# Patient Record
Sex: MALE | Race: WHITE | NOT HISPANIC OR LATINO | Employment: UNEMPLOYED | URBAN - METROPOLITAN AREA
[De-identification: names, ages, dates, MRNs, and addresses within clinical notes are randomized per-mention and may not be internally consistent; named-entity substitution may affect disease eponyms.]

---

## 2021-10-05 ENCOUNTER — OFFICE VISIT (OUTPATIENT)
Dept: FAMILY MEDICINE | Facility: CLINIC | Age: 46
End: 2021-10-05

## 2021-10-05 VITALS
HEIGHT: 73 IN | HEART RATE: 83 BPM | RESPIRATION RATE: 18 BRPM | BODY MASS INDEX: 31.81 KG/M2 | WEIGHT: 240 LBS | DIASTOLIC BLOOD PRESSURE: 78 MMHG | TEMPERATURE: 98 F | SYSTOLIC BLOOD PRESSURE: 124 MMHG | OXYGEN SATURATION: 97 %

## 2021-10-05 DIAGNOSIS — E78.5 DYSLIPIDEMIA: ICD-10-CM

## 2021-10-05 DIAGNOSIS — M54.50 CHRONIC BILATERAL LOW BACK PAIN, UNSPECIFIED WHETHER SCIATICA PRESENT: ICD-10-CM

## 2021-10-05 DIAGNOSIS — E11.40 DIABETIC NEUROPATHY, PAINFUL: ICD-10-CM

## 2021-10-05 DIAGNOSIS — I25.10 CORONARY ARTERY DISEASE INVOLVING NATIVE CORONARY ARTERY OF NATIVE HEART WITHOUT ANGINA PECTORIS: ICD-10-CM

## 2021-10-05 DIAGNOSIS — E11.621 DIABETIC ULCER OF TOE OF RIGHT FOOT ASSOCIATED WITH TYPE 2 DIABETES MELLITUS, UNSPECIFIED ULCER STAGE: ICD-10-CM

## 2021-10-05 DIAGNOSIS — L97.529 DIABETIC ULCER OF TOE OF LEFT FOOT ASSOCIATED WITH TYPE 2 DIABETES MELLITUS, UNSPECIFIED ULCER STAGE: ICD-10-CM

## 2021-10-05 DIAGNOSIS — I10 ESSENTIAL HYPERTENSION: ICD-10-CM

## 2021-10-05 DIAGNOSIS — Z76.89 ENCOUNTER TO ESTABLISH CARE: Primary | ICD-10-CM

## 2021-10-05 DIAGNOSIS — L97.509 TYPE 2 DIABETES MELLITUS WITH FOOT ULCER, WITH LONG-TERM CURRENT USE OF INSULIN: ICD-10-CM

## 2021-10-05 DIAGNOSIS — Z79.891 LONG TERM CURRENT USE OF OPIATE ANALGESIC: ICD-10-CM

## 2021-10-05 DIAGNOSIS — E11.621 DIABETIC ULCER OF TOE OF LEFT FOOT ASSOCIATED WITH TYPE 2 DIABETES MELLITUS, UNSPECIFIED ULCER STAGE: ICD-10-CM

## 2021-10-05 DIAGNOSIS — Z79.899 ENCOUNTER FOR LONG-TERM (CURRENT) USE OF OTHER MEDICATIONS: ICD-10-CM

## 2021-10-05 DIAGNOSIS — Z79.4 TYPE 2 DIABETES MELLITUS WITH FOOT ULCER, WITH LONG-TERM CURRENT USE OF INSULIN: ICD-10-CM

## 2021-10-05 DIAGNOSIS — L97.519 DIABETIC ULCER OF TOE OF RIGHT FOOT ASSOCIATED WITH TYPE 2 DIABETES MELLITUS, UNSPECIFIED ULCER STAGE: ICD-10-CM

## 2021-10-05 DIAGNOSIS — G89.29 CHRONIC BILATERAL LOW BACK PAIN, UNSPECIFIED WHETHER SCIATICA PRESENT: ICD-10-CM

## 2021-10-05 DIAGNOSIS — E11.621 TYPE 2 DIABETES MELLITUS WITH FOOT ULCER, WITH LONG-TERM CURRENT USE OF INSULIN: ICD-10-CM

## 2021-10-05 PROCEDURE — 99214 PR OFFICE/OUTPT VISIT, EST, LEVL IV, 30-39 MIN: ICD-10-PCS | Mod: S$PBB,,, | Performed by: NURSE PRACTITIONER

## 2021-10-05 PROCEDURE — 99205 OFFICE O/P NEW HI 60 MIN: CPT | Performed by: NURSE PRACTITIONER

## 2021-10-05 PROCEDURE — 99214 OFFICE O/P EST MOD 30 MIN: CPT | Mod: S$PBB,,, | Performed by: NURSE PRACTITIONER

## 2021-10-05 RX ORDER — LISINOPRIL 40 MG/1
40 TABLET ORAL DAILY
COMMUNITY

## 2021-10-05 RX ORDER — OXYCODONE AND ACETAMINOPHEN 10; 325 MG/1; MG/1
1 TABLET ORAL EVERY 6 HOURS PRN
Qty: 28 TABLET | Refills: 0 | Status: SHIPPED | OUTPATIENT
Start: 2021-10-05 | End: 2021-10-12

## 2021-10-05 RX ORDER — INSULIN GLARGINE 100 [IU]/ML
50 INJECTION, SOLUTION SUBCUTANEOUS 2 TIMES DAILY
COMMUNITY
End: 2021-10-05 | Stop reason: SDUPTHER

## 2021-10-05 RX ORDER — INSULIN LISPRO 100 [IU]/ML
INJECTION, SOLUTION INTRAVENOUS; SUBCUTANEOUS
COMMUNITY

## 2021-10-05 RX ORDER — INSULIN GLARGINE 100 [IU]/ML
50 INJECTION, SOLUTION SUBCUTANEOUS 2 TIMES DAILY
Qty: 30 ML | Refills: 2 | Status: SHIPPED | OUTPATIENT
Start: 2021-10-05

## 2021-10-05 RX ORDER — DESONIDE 0.5 MG/G
CREAM TOPICAL 2 TIMES DAILY PRN
COMMUNITY

## 2021-10-05 RX ORDER — OXYCODONE HYDROCHLORIDE 15 MG/1
15 TABLET ORAL EVERY 4 HOURS PRN
COMMUNITY

## 2021-10-05 RX ORDER — OXYCODONE AND ACETAMINOPHEN 10; 325 MG/1; MG/1
1 TABLET ORAL EVERY 6 HOURS PRN
Qty: 28 TABLET | Refills: 0 | Status: SHIPPED | OUTPATIENT
Start: 2021-10-05 | End: 2021-10-05

## 2021-10-06 ENCOUNTER — TELEPHONE (OUTPATIENT)
Dept: FAMILY MEDICINE | Facility: CLINIC | Age: 46
End: 2021-10-06

## 2023-02-14 ENCOUNTER — TELEPHONE (OUTPATIENT)
Dept: FAMILY MEDICINE | Facility: CLINIC | Age: 48
End: 2023-02-14
Payer: COMMERCIAL

## 2023-02-14 NOTE — TELEPHONE ENCOUNTER
Wanted to know what time his appt was for tomorrow.    Did let wife know it was at 10:40 2/15/23    Keara Gaines RN  [facility] ~ Registered Nurse  Clinic Triage ~ [facility]  February 14, 2023

## 2023-02-15 ENCOUNTER — TELEPHONE (OUTPATIENT)
Dept: WOUND CARE | Facility: CLINIC | Age: 48
End: 2023-02-15

## 2023-02-15 ENCOUNTER — OFFICE VISIT (OUTPATIENT)
Dept: FAMILY MEDICINE | Facility: CLINIC | Age: 48
End: 2023-02-15
Payer: COMMERCIAL

## 2023-02-15 ENCOUNTER — TELEPHONE (OUTPATIENT)
Dept: OTHER | Facility: CLINIC | Age: 48
End: 2023-02-15

## 2023-02-15 VITALS
OXYGEN SATURATION: 97 % | SYSTOLIC BLOOD PRESSURE: 140 MMHG | TEMPERATURE: 97.8 F | WEIGHT: 233 LBS | HEIGHT: 71 IN | HEART RATE: 71 BPM | BODY MASS INDEX: 32.62 KG/M2 | RESPIRATION RATE: 14 BRPM | DIASTOLIC BLOOD PRESSURE: 77 MMHG

## 2023-02-15 DIAGNOSIS — L97.516 DIABETIC ULCER OF TOE OF RIGHT FOOT ASSOCIATED WITH TYPE 2 DIABETES MELLITUS, WITH BONE INVOLVEMENT WITHOUT EVIDENCE OF NECROSIS (H): ICD-10-CM

## 2023-02-15 DIAGNOSIS — E11.621 DIABETIC ULCER OF TOE OF RIGHT FOOT ASSOCIATED WITH TYPE 2 DIABETES MELLITUS, WITH BONE INVOLVEMENT WITHOUT EVIDENCE OF NECROSIS (H): ICD-10-CM

## 2023-02-15 DIAGNOSIS — M86.071 ACUTE HEMATOGENOUS OSTEOMYELITIS OF RIGHT FOOT (H): ICD-10-CM

## 2023-02-15 DIAGNOSIS — E11.628 DIABETIC FOOT INFECTION (H): ICD-10-CM

## 2023-02-15 DIAGNOSIS — M86.60 CHRONIC OSTEOMYELITIS (H): Primary | ICD-10-CM

## 2023-02-15 DIAGNOSIS — L97.516 DIABETIC ULCER OF TOE OF RIGHT FOOT ASSOCIATED WITH TYPE 2 DIABETES MELLITUS, WITH BONE INVOLVEMENT WITHOUT EVIDENCE OF NECROSIS (H): Primary | ICD-10-CM

## 2023-02-15 DIAGNOSIS — N18.9 CHRONIC KIDNEY DISEASE-MINERAL AND BONE DISORDER: ICD-10-CM

## 2023-02-15 DIAGNOSIS — G89.4 CHRONIC PAIN SYNDROME: ICD-10-CM

## 2023-02-15 DIAGNOSIS — M89.9 CHRONIC KIDNEY DISEASE-MINERAL AND BONE DISORDER: ICD-10-CM

## 2023-02-15 DIAGNOSIS — E83.9 CHRONIC KIDNEY DISEASE-MINERAL AND BONE DISORDER: ICD-10-CM

## 2023-02-15 DIAGNOSIS — L08.9 DIABETIC FOOT INFECTION (H): ICD-10-CM

## 2023-02-15 DIAGNOSIS — E11.621 DIABETIC ULCER OF TOE OF RIGHT FOOT ASSOCIATED WITH TYPE 2 DIABETES MELLITUS, WITH BONE INVOLVEMENT WITHOUT EVIDENCE OF NECROSIS (H): Primary | ICD-10-CM

## 2023-02-15 PROBLEM — E11.40 DIABETIC NEUROPATHY (H): Status: ACTIVE | Noted: 2019-08-15

## 2023-02-15 LAB
ALBUMIN SERPL BCG-MCNC: 3.9 G/DL (ref 3.5–5.2)
ALP SERPL-CCNC: 87 U/L (ref 40–129)
ALT SERPL W P-5'-P-CCNC: 14 U/L (ref 10–50)
ANION GAP SERPL CALCULATED.3IONS-SCNC: 13 MMOL/L (ref 7–15)
AST SERPL W P-5'-P-CCNC: 14 U/L (ref 10–50)
BASOPHILS # BLD AUTO: 0 10E3/UL (ref 0–0.2)
BASOPHILS NFR BLD AUTO: 0 %
BILIRUB SERPL-MCNC: 0.2 MG/DL
BUN SERPL-MCNC: 22.1 MG/DL (ref 6–20)
CALCIUM SERPL-MCNC: 9.7 MG/DL (ref 8.6–10)
CHLORIDE SERPL-SCNC: 103 MMOL/L (ref 98–107)
CHOLEST SERPL-MCNC: 194 MG/DL
CREAT SERPL-MCNC: 1.01 MG/DL (ref 0.67–1.17)
CREAT UR-MCNC: 77 MG/DL
CREAT UR-MCNC: 77.2 MG/DL
CRP SERPL-MCNC: 31 MG/L
DEPRECATED HCO3 PLAS-SCNC: 21 MMOL/L (ref 22–29)
EOSINOPHIL # BLD AUTO: 0.2 10E3/UL (ref 0–0.7)
EOSINOPHIL NFR BLD AUTO: 3 %
ERYTHROCYTE [DISTWIDTH] IN BLOOD BY AUTOMATED COUNT: 13.2 % (ref 10–15)
ERYTHROCYTE [SEDIMENTATION RATE] IN BLOOD BY WESTERGREN METHOD: 92 MM/HR (ref 0–15)
GFR SERPL CREATININE-BSD FRML MDRD: >90 ML/MIN/1.73M2
GLUCOSE SERPL-MCNC: 282 MG/DL (ref 70–99)
HBA1C MFR BLD: 9.8 % (ref 0–5.6)
HCT VFR BLD AUTO: 40.9 % (ref 40–53)
HDLC SERPL-MCNC: 26 MG/DL
HGB BLD-MCNC: 13.4 G/DL (ref 13.3–17.7)
LDLC SERPL CALC-MCNC: 96 MG/DL
LYMPHOCYTES # BLD AUTO: 1.7 10E3/UL (ref 0.8–5.3)
LYMPHOCYTES NFR BLD AUTO: 19 %
MCH RBC QN AUTO: 28.9 PG (ref 26.5–33)
MCHC RBC AUTO-ENTMCNC: 32.8 G/DL (ref 31.5–36.5)
MCV RBC AUTO: 88 FL (ref 78–100)
MICROALBUMIN UR-MCNC: 474 MG/L
MICROALBUMIN/CREAT UR: 613.99 MG/G CR (ref 0–17)
MONOCYTES # BLD AUTO: 0.4 10E3/UL (ref 0–1.3)
MONOCYTES NFR BLD AUTO: 5 %
NEUTROPHILS # BLD AUTO: 6.6 10E3/UL (ref 1.6–8.3)
NEUTROPHILS NFR BLD AUTO: 74 %
NONHDLC SERPL-MCNC: 168 MG/DL
PLATELET # BLD AUTO: 192 10E3/UL (ref 150–450)
POTASSIUM SERPL-SCNC: 5.1 MMOL/L (ref 3.4–5.3)
PROT SERPL-MCNC: 7.4 G/DL (ref 6.4–8.3)
RBC # BLD AUTO: 4.64 10E6/UL (ref 4.4–5.9)
SODIUM SERPL-SCNC: 137 MMOL/L (ref 136–145)
TRIGL SERPL-MCNC: 358 MG/DL
WBC # BLD AUTO: 8.9 10E3/UL (ref 4–11)

## 2023-02-15 PROCEDURE — 80307 DRUG TEST PRSMV CHEM ANLYZR: CPT | Performed by: FAMILY MEDICINE

## 2023-02-15 PROCEDURE — 99205 OFFICE O/P NEW HI 60 MIN: CPT | Performed by: FAMILY MEDICINE

## 2023-02-15 PROCEDURE — 80053 COMPREHEN METABOLIC PANEL: CPT | Performed by: FAMILY MEDICINE

## 2023-02-15 PROCEDURE — 82043 UR ALBUMIN QUANTITATIVE: CPT | Performed by: FAMILY MEDICINE

## 2023-02-15 PROCEDURE — 85652 RBC SED RATE AUTOMATED: CPT | Performed by: FAMILY MEDICINE

## 2023-02-15 PROCEDURE — 85025 COMPLETE CBC W/AUTO DIFF WBC: CPT | Performed by: FAMILY MEDICINE

## 2023-02-15 PROCEDURE — 83036 HEMOGLOBIN GLYCOSYLATED A1C: CPT | Performed by: FAMILY MEDICINE

## 2023-02-15 PROCEDURE — 82570 ASSAY OF URINE CREATININE: CPT | Performed by: FAMILY MEDICINE

## 2023-02-15 PROCEDURE — 99207 PR FOOT EXAM NO CHARGE: CPT | Performed by: FAMILY MEDICINE

## 2023-02-15 PROCEDURE — 80061 LIPID PANEL: CPT | Performed by: FAMILY MEDICINE

## 2023-02-15 PROCEDURE — 86140 C-REACTIVE PROTEIN: CPT | Performed by: FAMILY MEDICINE

## 2023-02-15 PROCEDURE — 36415 COLL VENOUS BLD VENIPUNCTURE: CPT | Performed by: FAMILY MEDICINE

## 2023-02-15 RX ORDER — INSULIN GLARGINE 100 [IU]/ML
50 INJECTION, SOLUTION SUBCUTANEOUS
COMMUNITY
Start: 2021-10-05

## 2023-02-15 RX ORDER — GABAPENTIN 800 MG/1
TABLET ORAL
COMMUNITY
Start: 2022-05-15

## 2023-02-15 RX ORDER — METOPROLOL TARTRATE 25 MG/1
TABLET, FILM COATED ORAL
COMMUNITY
Start: 2022-10-14

## 2023-02-15 RX ORDER — L. ACIDOPHILUS/BIFIDO. LONGUM 15 MG
CAPSULE,DELAYED RELEASE (ENTERIC COATED) ORAL
COMMUNITY
Start: 2022-02-08

## 2023-02-15 RX ORDER — LISINOPRIL 40 MG/1
TABLET ORAL
COMMUNITY
Start: 2022-09-01

## 2023-02-15 RX ORDER — ASPIRIN 81 MG/81MG
CAPSULE ORAL
COMMUNITY

## 2023-02-15 RX ORDER — DESONIDE 0.5 MG/G
CREAM TOPICAL
COMMUNITY
Start: 2022-04-01

## 2023-02-15 RX ORDER — INSULIN LISPRO 100 [IU]/ML
INJECTION, SOLUTION INTRAVENOUS; SUBCUTANEOUS
COMMUNITY

## 2023-02-15 RX ORDER — OXYCODONE HYDROCHLORIDE 20 MG/1
TABLET ORAL
COMMUNITY
Start: 2022-10-18 | End: 2023-02-15

## 2023-02-15 RX ORDER — OXYCODONE HYDROCHLORIDE 20 MG/1
20 TABLET ORAL EVERY 6 HOURS
Qty: 120 TABLET | Refills: 0 | Status: SHIPPED | OUTPATIENT
Start: 2023-02-15 | End: 2023-02-16

## 2023-02-15 RX ORDER — TRAZODONE HYDROCHLORIDE 100 MG/1
TABLET ORAL
COMMUNITY
Start: 2023-01-03

## 2023-02-15 RX ORDER — INSULIN LISPRO 100 [IU]/ML
15 INJECTION, SOLUTION INTRAVENOUS; SUBCUTANEOUS
COMMUNITY
Start: 2022-02-17

## 2023-02-15 RX ORDER — NITROGLYCERIN 0.4 MG/1
TABLET SUBLINGUAL
COMMUNITY
Start: 2022-06-06

## 2023-02-15 RX ORDER — BLOOD SUGAR DIAGNOSTIC
STRIP MISCELLANEOUS
COMMUNITY

## 2023-02-15 RX ORDER — ALBUTEROL SULFATE 90 UG/1
AEROSOL, METERED RESPIRATORY (INHALATION)
COMMUNITY
Start: 2022-05-02

## 2023-02-15 RX ORDER — CLOPIDOGREL BISULFATE 75 MG/1
TABLET ORAL
COMMUNITY
Start: 2022-03-14

## 2023-02-15 ASSESSMENT — ANXIETY QUESTIONNAIRES
4. TROUBLE RELAXING: NOT AT ALL
8. IF YOU CHECKED OFF ANY PROBLEMS, HOW DIFFICULT HAVE THESE MADE IT FOR YOU TO DO YOUR WORK, TAKE CARE OF THINGS AT HOME, OR GET ALONG WITH OTHER PEOPLE?: NOT DIFFICULT AT ALL
7. FEELING AFRAID AS IF SOMETHING AWFUL MIGHT HAPPEN: NOT AT ALL
6. BECOMING EASILY ANNOYED OR IRRITABLE: NOT AT ALL
GAD7 TOTAL SCORE: 0
GAD7 TOTAL SCORE: 0
1. FEELING NERVOUS, ANXIOUS, OR ON EDGE: NOT AT ALL
2. NOT BEING ABLE TO STOP OR CONTROL WORRYING: NOT AT ALL
5. BEING SO RESTLESS THAT IT IS HARD TO SIT STILL: NOT AT ALL
IF YOU CHECKED OFF ANY PROBLEMS ON THIS QUESTIONNAIRE, HOW DIFFICULT HAVE THESE PROBLEMS MADE IT FOR YOU TO DO YOUR WORK, TAKE CARE OF THINGS AT HOME, OR GET ALONG WITH OTHER PEOPLE: NOT DIFFICULT AT ALL
GAD7 TOTAL SCORE: 0
3. WORRYING TOO MUCH ABOUT DIFFERENT THINGS: NOT AT ALL
7. FEELING AFRAID AS IF SOMETHING AWFUL MIGHT HAPPEN: NOT AT ALL

## 2023-02-15 ASSESSMENT — PAIN SCALES - GENERAL: PAINLEVEL: WORST PAIN (10)

## 2023-02-15 NOTE — LETTER
Opioid / Opioid Plus Controlled Substance Agreement    This is an agreement between you and your provider about the safe and appropriate use of controlled substance/opioids prescribed by your care team. Controlled substances are medicines that can cause physical and mental dependence (abuse).    There are strict laws about having and using these medicines. We here at [facility] are committing to working with you in your efforts to get better. To support you in this work, we ll help you schedule regular office appointments for medicine refills. If we must cancel or change your appointment for any reason, we ll make sure you have enough medicine to last until your next appointment.     As a Provider, I will:    Listen carefully to your concerns and treat you with respect.     Recommend a treatment plan that I believe is in your best interest. This plan may involve therapies other than opioid pain medication.     Talk with you often about the possible benefits, and the risk of harm of any medicine that we prescribe for you.     Provide a plan on how to taper (discontinue or go off) using this medicine if the decision is made to stop its use.    As a Patient, I understand that opioid(s):     Are a controlled substance prescribed by my care team to help me function or work and manage my condition(s).     Are strong medicines and can cause serious side effects such as:    Drowsiness, which can seriously affect my driving ability    A lower breathing rate, enough to cause death    Harm to my thinking ability     Depression     Abuse of and addiction to this medicine    Need to be taken exactly as prescribed. Combining opioids with certain medicines or chemicals (such as illegal drugs, sedatives, sleeping pills, and benzodiazepines) can be dangerous or even fatal. If I stop opioids suddenly, I may have severe withdrawal symptoms.    Do not work for all types of pain nor for all patients. If they re not helpful, I may  be asked to stop them.        The risks, benefits and side effects of these medicine(s) were explained to me. I agree that:  1. I will take part in other treatments as advised by my care team. This may be psychiatry or counseling, physical therapy, behavioral therapy, group treatment or a referral to a specialist.     2. I will keep all my appointments. I understand that this is part of the monitoring of opioids. My care team may require an office visit for EVERY opioid/controlled substance refill. If I miss appointments or don t follow instructions, my care team may stop my medicine.    3. I will take my medicines as prescribed. I will not change the dose or schedule unless my care team tells me to. There will be no refills if I run out early.     4. I may be asked to come to the clinic and complete a urine drug test or complete a pill count at any time. If I don t give a urine sample or participate in a pill count, the care team may stop my medicine.    5. I will only receive prescriptions from this clinic for chronic pain. If I am treated by another provider for acute pain issues, I will tell them that I am taking opioid pain medication for chronic pain and that I have a treatment agreement with this provider. I will inform my [facility] care team within one business day if I am given a prescription for any pain medication by another healthcare provider. My [facility] care team can contact other providers and pharmacists about my use of any medicines.    6. It is up to me to make sure that I don t run out of my medicines on weekends or holidays. If my care team is willing to refill my opioid prescription without a visit, I must request refills only during office hours. Refills may take up to 3 business days to process. I will use one pharmacy to fill all my opioid and other controlled substance prescriptions. I will notify the clinic about any changes to my insurance or medication  availability.    7. I am responsible for my prescriptions. If the medicine/prescription is lost, stolen or destroyed, it will not be replaced. I also agree not to share controlled substance medicines with anyone.    8. I am aware I should not use any illegal or recreational drugs. I agree not to drink alcohol unless my care team says I can.       9. If I enroll in the [place] Medical Cannabis program, I will tell my care team prior to my next refill.     10. I will tell my care team right away if I become pregnant, have a new medical problem treated outside of my regular clinic, or have a change in my medications.    11. I understand that this medicine can affect my thinking, judgment and reaction time. Alcohol and drugs affect the brain and body, which can affect the safety of my driving. Being under the influence of alcohol or drugs can affect my decision-making, behaviors, personal safety, and the safety of others. Driving while impaired (DWI) can occur if a person is driving, operating, or in physical control of a car, motorcycle, boat, snowmobile, ATV, motorbike, off-road vehicle, or any other motor vehicle ([place] Statute 169A.20). I understand the risk if I choose to drive or operate any vehicle or machinery.    I understand that if I do not follow any of the conditions above, my prescriptions or treatment may be stopped or changed.          Opioids  What You Need to Know    What are opioids?   Opioids are pain medicines that must be prescribed by a doctor. They are also known as narcotics.     Examples are:   1. morphine (MS Contin, Thu)  2. oxycodone (Oxycontin)  3. oxycodone and acetaminophen (Percocet)  4. hydrocodone and acetaminophen (Vicodin, Norco)   5. fentanyl patch (Duragesic)   6. hydromorphone (Dilaudid)   7. methadone  8. codeine (Tylenol #3)     What do opioids do well?   Opioids are best for severe short-term pain such as after a surgery or injury. They may work well for cancer pain. They may  help some people with long-lasting (chronic) pain.     What do opioids NOT do well?   Opioids never get rid of pain entirely, and they don t work well for most patients with chronic pain. Opioids don t reduce swelling, one of the causes of pain.                                    Other ways to manage chronic pain and improve function include:       Treat the health problem that may be causing pain    Anti-inflammation medicines, which reduce swelling and tenderness, such as ibuprofen (Advil, Motrin) or naproxen (Aleve)    Acetaminophen (Tylenol)    Antidepressants and anti-seizure medicines, especially for nerve pain    Topical treatments such as patches or creams    Injections or nerve blocks    Chiropractic or osteopathic treatment    Acupuncture, massage, deep breathing, meditation, visual imagery, aromatherapy    Use heat or ice at the pain site    Physical therapy     Exercise    Stop smoking    Take part in therapy       Risks and side effects     Talk to your doctor before you start or decide to keep taking opioids. Possible side effects include:      Lowering your breathing rate enough to cause death    Overdose, including death, especially if taking higher than prescribed doses    Worse depression symptoms; less pleasure in things you usually enjoy    Feeling tired or sluggish    Slower thoughts or cloudy thinking    Being more sensitive to pain over time; pain is harder to control    Trouble sleeping or restless sleep    Changes in hormone levels (for example, less testosterone)    Changes in sex drive or ability to have sex    Constipation    Unsafe driving    Itching and sweating    Dizziness    Nausea, throwing up and dry mouth    What else should I know about opioids?    Opioids may lead to dependence, tolerance, or addiction.      Dependence means that if you stop or reduce the medicine too quickly, you will have withdrawal symptoms. These include loose poop (diarrhea), jitters, flu-like symptoms,  nervousness and tremors. Dependence is not the same as addiction.                       Tolerance means needing higher doses over time to get the same effect. This may increase the chance of serious side effects.      Addiction is when people improperly use a substance that harms their body, their mind or their relations with others. Use of opiates can cause a relapse of addiction if you have a history of drug or alcohol abuse.      People who have used opioids for a long time may have a lower quality of life, worse depression, higher levels of pain and more visits to doctors.    You can overdose on opioids. Take these steps to lower your risk of overdose:    1. Recognize the signs:  Signs of overdose include decrease or loss of consciousness (blackout), slowed breathing, trouble waking up and blue lips. If someone is worried about overdose, they should call 911.    2. Talk to your doctor about Narcan (naloxone).   If you are at risk for overdose, you may be given a prescription for Narcan. This medicine very quickly reverses the effects of opioids.   If you overdose, a friend or family member can give you Narcan while waiting for the ambulance. They need to know the signs of overdose and how to give Narcan.     3. Don't use alcohol or street drugs.   Taking them with opioids can cause death.    4. Do not take any of these medicines unless your doctor says it s OK. Taking these with opioids can cause death:    Benzodiazepines, such as lorazepam (Ativan), alprazolam (Xanax) or diazepam (Valium)    Muscle relaxers, such as cyclobenzaprine (Flexeril)    Sleeping pills like zolpidem (Ambien)     Other opioids      How to keep you and other people safe while taking opioids:    1. Never share your opioids with others.  Opioid medicines are regulated by the Drug Enforcement Agency ([employer]). Selling or sharing medications is a criminal act.    2. Be sure to store opioids in a secure place, locked up if possible. Young children  can easily swallow them and overdose.    3. When you are traveling with your medicines, keep them in the original bottles. If you use a pill box, be sure you also carry a copy of your medicine list from your clinic or pharmacy.    4. Safe disposal of opioids    Most pharmacies have places to get rid of medicine, called disposal kiosks. Medicine disposal options are also available in every [facility]. Search your county and  medication disposal  to find more options. You can find more details at:  [email or web address]     I agree that my provider, clinic care team, and pharmacy may work with any city, state or federal law enforcement agency that investigates the misuse, sale, or other diversion of my controlled medicine. I will allow my provider to discuss my care with, or share a copy of, this agreement with any other treating provider, pharmacy or emergency room where I receive care.    I have read this agreement and have asked questions about anything I did not understand.    _______________________________________________________  Patient Signature - Olivier Thacker _____________________                   Date     _______________________________________________________  Provider Signature - Rocael Servin MD   _____________________                   Date     _______________________________________________________  Witness Signature (required if provider not present while patient signing)   _____________________                   Date

## 2023-02-15 NOTE — TELEPHONE ENCOUNTER
Pt referred to [facility] by Rocael Servin MD for bilateral foot ulcer.    Per review of chart, appears patient has traveled from [place] to [place] for second opinion.    Care Everywhere:  10/2/22 - BLE arterial duplex (formerly [facility])  IMPRESSION:   Flow seen throughout both lower extremities with degradation of waveforms in the 3 runoff vessels and dorsalis pedis artery but color flow is seen throughout. There is stenoses mild at the tibial peroneal trunk on the right more significant at the popliteal artery on the left.          10/6/22 BLE angiogram: (formerly [facility])  Critical limb ischemia with left lower extremity nonhealing wound and   bilateral osteomyelitis.   Severe left anterior tibial disease   Severe left peroneal disease   Moderate nonobstructive right distal superficial femoral artery disease   Moderate nonobstructive right popliteal disease   Severe right anterior tibial disease   Successful atherectomy, angioplasty and drug-eluting balloon angioplasty   of the left anterior tibial artery   Successful atherectomy and angioplasty of the left peroneal artery   Successful atherectomy and angioplasty of the right anterior tibial artery       Pt needs to be scheduled for MARCI/TBI and consult with Vascular Surgery.  Will route to scheduling to coordinate an appointment within 1-2 weeks.    Please advise patient he needs to request to have imaging sent to [facility] on a disc of the above imaging from formerly [facility].      Appt note: Ref by Dr. Rocael Servin for bilateral foot ulcers; notes in Care Everywhere; seeking second opinion from [place]; angio report from 10/6/22 in Care Everywhere; MARCI/TBI to be scheduled.    JAKI HernándezN, RN-[facility] [place]

## 2023-02-15 NOTE — TELEPHONE ENCOUNTER
"Multiple family members called on behalf of the patient. Writer spoke with both the patient's daughter and wife. Patient has wounds on both feet. 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 Patient has struggled with healing since then. He also has a right foot ulcer on the bottom of the right foot and pinky toe of the right foot with an active infection as well as osteomyelitis. The patient's wife also noted \"lots\" of swelling and drainage from both feet. Patient is a diabetic.     He and his wife very recently moved up to [place] from [place] in hopes of finding care. They are still getting settled have yet to secure wound care, however the wife has been doing what she can to provide him wound care.       According to the wife, the patient has Blue [employer] ([place] [employer]) insurance. Member # is [id number]. It is the Silver Plan. Writer will work on adding coverage to his chart.    Please call Essie (patient's wife) at [phone]  "

## 2023-02-15 NOTE — PROGRESS NOTES
Assessment & Plan     Olivier was seen today for establish care and diabetes.    Diagnoses and all orders for this visit:    Chronic osteomyelitis (H)  Diabetic ulcer of toe of right foot associated with type 2 diabetes mellitus, with bone involvement without evidence of necrosis (H)  Diabetic foot infection (H)  Acute hematogenous osteomyelitis of right foot (H)  47-year-old who presents to the clinic to establish care.  Patient moved from [place] to [place] seeking a second opinion regarding chronic osteomyelitis.  He was seen and evaluated by infectious disease and vascular recently and it was recommended to proceed with an amputation of the left foot.  Patient does not desire to proceed with an amputation therefore he is here seeking a second opinion.  His medical history significant for poorly controlled type 2 diabetes, hypertension, coronary artery disease, hyperlipidemia, peripheral neuropathy, factor V Leiden carrier, mild intermittent asthma, chronic pain, continuous use of opioids.  Clinical examination today is consistent with osteomyelitis.  The patient is afebrile with vitals significant for mildly elevated blood pressure.  Initial lab results were negative for leukocytosis.  Mild to moderate elevation of his ESR and CRP which is consistent with chronic osteomyelitis.  Patient was advised to establish care with vascular, wound clinic, and pain clinic.  Return to clinic for management of other chronic diseases.  I extended his Ceftin prescription for the next 2 weeks.  Also referred him to infectious diseases.  -     MR Foot Left w/o Contrast; Future  -     MR Foot Right w/o Contrast; Future  -     Vascular Surgery Referral; Future  -     Wound Care Referral; Future  -     Discontinue: oxyCODONE HCl (ROXICODONE) 20 MG TABS immediate release tablet; Take 20 mg by mouth every 6 hours for 30 days  -     CBC with platelets and differential; Future  -     Comprehensive metabolic panel (BMP + Alb, Alk Phos,  ALT, AST, Total. Bili, TP); Future  -     ESR: Erythrocyte sedimentation rate; Future  -     CRP, inflammation; Future  -     Pain Management  Referral; Future  -     Drug Confirmation Panel Urine with Creat - lab collect; Future  -     [facility]; [id number]; COMPREHENSIVE DRUG PROFILE, URINE (Laboratory Miscellaneous Order); Future  -     [facility] Miscellaneous Testing  -     Discontinue: cefuroxime (CEFTIN) 500 MG tablet; Take 1 tablet (500 mg) by mouth 2 times daily for 14 days  -     oxyCODONE HCl (ROXICODONE) 20 MG TABS immediate release tablet; Take 20 mg by mouth every 6 hours  -     cefuroxime (CEFTIN) 500 MG tablet; Take 1 tablet (500 mg) by mouth 2 times daily    Chronic kidney disease-mineral and bone disorder  -     MR Foot Left w/o Contrast; Future  -     MR Foot Right w/o Contrast; Future  -     Vascular Surgery Referral; Future  -     Wound Care Referral; Future  -     Discontinue: oxyCODONE HCl (ROXICODONE) 20 MG TABS immediate release tablet; Take 20 mg by mouth every 6 hours for 30 days  -     CBC with platelets and differential; Future  -     Comprehensive metabolic panel (BMP + Alb, Alk Phos, ALT, AST, Total. Bili, TP); Future  -     ESR: Erythrocyte sedimentation rate; Future  -     CRP, inflammation; Future  -     Pain Management  Referral; Future  -     Drug Confirmation Panel Urine with Creat - lab collect; Future  -     oxyCODONE HCl (ROXICODONE) 20 MG TABS immediate release tablet; Take 20 mg by mouth every 6 hours    Chronic pain syndrome  -     Drug Confirmation Panel Urine with Creat - lab collect; Future  -     [facility]; [id number]; COMPREHENSIVE DRUG PROFILE, URINE (Laboratory Miscellaneous Order); Future  -     [facility] Miscellaneous Testing      Return in about 15 days (around 3/2/2023) for Follow-up visit.    Rocael Servin MD  [facility]    Mahnaz Aguayo is a 47 year old, presenting for the following health issues:  Establish Care and  Diabetes      History of Present Illness     Asthma:  He presents for follow up of asthma.  He has no cough, some wheezing, and some shortness of breath. He is using a relief medication a few times a month. He does not have a controller medication. Patient is aware of the following triggers: none. The patient has had a visit to the Emergency Room, Urgent Care or Hospital due to asthma since the last clinic visit. He has been to the Emergency Room or Urgent Care 0 times.He has had a Hospitalization 0 times.    Back Pain:  He presents for follow up of back pain. Patient's back pain is a chronic problem.  Location of back pain:  Right lower back, left lower back, right middle of back, left middle of back, right upper back, left upper back, right side of neck, left side of neck, right shoulder and left shoulder  Description of back pain: sharp  Back pain spreads: right buttocks, left buttocks, right thigh, left thigh, right knee, left knee, right foot, left foot, right shoulder, left shoulder, right side of neck and left side of neck    Since patient first noticed back pain, pain is: unchanged  Does back pain interfere with his job:  Yes      CKD: He is not using over the counter pain medicine.     Mental Health Follow-up:  Patient presents to follow-up on Anxiety.    Patient's anxiety since last visit has been:  Good  The patient is not having other symptoms associated with anxiety.  Any significant life events: health concerns  Patient is not feeling anxious or having panic attacks.  Patient has no concerns about alcohol or drug use.    Diabetes:   He presents for follow up of diabetes.  He is checking home blood glucose four or more times daily. He checks blood glucose before meals and at bedtime.  Blood glucose is sometimes over 200 and never under 70. He is aware of hypoglycemia symptoms including shakiness, dizziness, weakness and confusion. He is concerned about frequent infections and blood sugar frequently over  200.  He is having numbness in feet, burning in feet and redness, sores, or blisters on feet.         Hypertension: He presents for follow up of hypertension.  He does check blood pressure  regularly outside of the clinic. Outside blood pressures have been over 140/90. He follows a low salt diet.     Vascular Disease:  He presents for follow up of vascular disease.  He never takes nitroglycerin. He takes daily aspirin.   Today's JAVIER-7 Score: 0  New patient who presents the clinic to establish care.  Moved with his family from [place] to [place] seeking a second opinion regarding chronic osteomyelitis.    Patient was diagnosed with diabetes at the age of 17. Started to have problems with circulation in his 20's.  Patient started smoking in 2003.  He was previously smoking 1 to 2 packs/day and currently smokes half a pack per day.  His diabetes was poorly controlled for many years.  In 2013 he was diagnosed with coronary artery disease and had a stent in place. Extensive vascular disease with coronary stents and peripheral procedures since 2013.  Peripheral vascular disease got progressively worse in the last few years until he was admitted to the hospital in October 2022 with osteomyelitis of the left foot and diagnosed with sepsis.  Patient had an partial amputation 5th digit and part of the foot in 10/2022  Patient went to another hospital and eventually ended up with a PICC line 10/14/2022- 11/20/22.  He received IV antibiotics for a total of 6 weeks in his recent visit with infectious diseases he was given a prescription of Ceftin twice a day for 2 weeks.  Recent visit with vascular in [place] recommended bilateral partial amputations.  Patient is in [place] with his family seeking a second opinion.    Patient has Charcot's foot, chronic osteomyelitis and bilateral chronic lower extremity pain.  He was seeing by pain specialist in [place] and had high doses of opioids daily.  Patient is currently taking  "120 MME per day and continues to report that it is not enough to control his pain.    Review of Systems   Constitutional, HEENT, cardiovascular, pulmonary, gi and gu systems are negative, except as otherwise noted.      Objective    BP (!) 140/77 (BP Location: Right arm, Patient Position: Sitting, Cuff Size: Adult Large)   Pulse 71   Temp 97.8  F (36.6  C) (Temporal)   Resp 14   Ht 1.81 m (5' 11.25\")   Wt 105.7 kg (233 lb)   SpO2 97%   BMI 32.27 kg/m    Body mass index is 32.27 kg/m .  Physical Exam   GENERAL: healthy, alert and no distress  RESP: lungs clear to auscultation - no rales, rhonchi or wheezes  CV: regular rate and rhythm, normal S1 S2, no S3 or S4, no murmur, click or rub, no peripheral edema and peripheral pulses strong  MS: Diminished sensation in bilateral feet.  Diabetic foot ulcer at the base of the fifth digit.  Malodorous drainage noted.  Subcutaneous fatty tissue is exposed.  Partial amputation of the left foot noted.  Large malodorous ulcer with necrotic tissue. Subcutaneous tissue exposed.          10/2/22 - BLE arterial duplex ([facility])  IMPRESSION:   Flow seen throughout both lower extremities with degradation of waveforms in the 3 runoff vessels and dorsalis pedis artery but color flow is seen throughout. There is stenoses mild at the tibial peroneal trunk on the right more significant at the popliteal artery on the left.                      10/6/22 BLE angiogram: ([facility])  Critical limb ischemia with left lower extremity nonhealing wound and   bilateral osteomyelitis.   Severe left anterior tibial disease   Severe left peroneal disease   Moderate nonobstructive right distal superficial femoral artery disease   Moderate nonobstructive right popliteal disease   Severe right anterior tibial disease   Successful atherectomy, angioplasty and drug-eluting balloon angioplasty   of the left anterior tibial artery   Successful atherectomy and angioplasty of the left peroneal " artery   Successful atherectomy and angioplasty of the right anterior tibial artery

## 2023-02-15 NOTE — RESULT ENCOUNTER NOTE
Can you assist the patient with scheduling an appointment for diabetes management.  He was seen today for osteomyelitis.  Next available appointment.    MD Gareth

## 2023-02-16 ENCOUNTER — TELEPHONE (OUTPATIENT)
Dept: FAMILY MEDICINE | Facility: CLINIC | Age: 48
End: 2023-02-16
Payer: COMMERCIAL

## 2023-02-16 DIAGNOSIS — E11.621 DIABETIC ULCER OF TOE OF RIGHT FOOT ASSOCIATED WITH TYPE 2 DIABETES MELLITUS, WITH BONE INVOLVEMENT WITHOUT EVIDENCE OF NECROSIS (H): ICD-10-CM

## 2023-02-16 DIAGNOSIS — E83.9 CHRONIC KIDNEY DISEASE-MINERAL AND BONE DISORDER: ICD-10-CM

## 2023-02-16 DIAGNOSIS — L08.9 DIABETIC FOOT INFECTION (H): ICD-10-CM

## 2023-02-16 DIAGNOSIS — M86.60 CHRONIC OSTEOMYELITIS (H): ICD-10-CM

## 2023-02-16 DIAGNOSIS — L97.516 DIABETIC ULCER OF TOE OF RIGHT FOOT ASSOCIATED WITH TYPE 2 DIABETES MELLITUS, WITH BONE INVOLVEMENT WITHOUT EVIDENCE OF NECROSIS (H): ICD-10-CM

## 2023-02-16 DIAGNOSIS — M86.071 ACUTE HEMATOGENOUS OSTEOMYELITIS OF RIGHT FOOT (H): ICD-10-CM

## 2023-02-16 DIAGNOSIS — N18.9 CHRONIC KIDNEY DISEASE-MINERAL AND BONE DISORDER: ICD-10-CM

## 2023-02-16 DIAGNOSIS — M89.9 CHRONIC KIDNEY DISEASE-MINERAL AND BONE DISORDER: ICD-10-CM

## 2023-02-16 DIAGNOSIS — E11.628 DIABETIC FOOT INFECTION (H): ICD-10-CM

## 2023-02-16 LAB
Lab: NORMAL
PERFORMING LABORATORY: NORMAL
SPECIMEN STATUS: NORMAL
TEST NAME: NORMAL

## 2023-02-16 RX ORDER — CEFUROXIME AXETIL 500 MG/1
500 TABLET ORAL 2 TIMES DAILY
Qty: 28 TABLET | Refills: 0 | Status: SHIPPED | OUTPATIENT
Start: 2023-02-16 | End: 2023-02-16

## 2023-02-16 RX ORDER — OXYCODONE HYDROCHLORIDE 20 MG/1
20 TABLET ORAL EVERY 6 HOURS
Qty: 120 TABLET | Refills: 0 | Status: SHIPPED | OUTPATIENT
Start: 2023-02-16

## 2023-02-16 RX ORDER — OXYCODONE HYDROCHLORIDE 20 MG/1
20 TABLET ORAL EVERY 6 HOURS
Qty: 120 TABLET | Refills: 0 | Status: CANCELLED | OUTPATIENT
Start: 2023-02-16

## 2023-02-16 RX ORDER — CEFUROXIME AXETIL 500 MG/1
500 TABLET ORAL 2 TIMES DAILY
Qty: 28 TABLET | Refills: 0 | Status: SHIPPED | OUTPATIENT
Start: 2023-02-16

## 2023-02-16 NOTE — TELEPHONE ENCOUNTER
Would you be willing to see this patient, with a plan to transition to Dr. Nunn in March when she has next availability?

## 2023-02-16 NOTE — TELEPHONE ENCOUNTER
If the patient has active infection with osteomyelitis and the foot seems to be getting worse, he should probably go to the hospital since osteomyelitis is usually surgically removed.     Sunita Espinoza DPM

## 2023-02-16 NOTE — TELEPHONE ENCOUNTER
FS,      Patient's wife called.  [facility] [place] dose not have Roxicodone in stock.  Thought they'd have it in today which is didn't happen.    Called [facility] and cancelled the Rx    Asking for Rx to be sent to [facility] in [place] ([phone])    Wife also asking for abx, was not sent to pharmacy  Send both to [facility]      Thank you,  Saba Chicas RN

## 2023-02-16 NOTE — TELEPHONE ENCOUNTER
Called patient and lvm to call clinic back at [phone].   See FS' note below for details.    Rocael Dillard MD [facility]  Can you assist the patient with scheduling an appointment for diabetes management.  He was seen today for osteomyelitis.   Next available appointment.     MD Gareth

## 2023-02-16 NOTE — TELEPHONE ENCOUNTER
Patient's wife is calling regarding lab results from yesterday.    Can someone review the labs and call them back at [phone].    Rachael Hussein RN  [facility]

## 2023-02-16 NOTE — TELEPHONE ENCOUNTER
Attempted call to Essie, patient's wife at [phone];  Mailbox full, cannot accept messages at this time.      Bilateral UE WFL for age/Right UE/Left UE

## 2023-02-16 NOTE — TELEPHONE ENCOUNTER
Essie, patient's wife, returned call to clinic;  Details as follows:  Writer advised patient via wife to present to ED secondary to acute on chronic infection concerns;  Suggested [facility] ED due to podiatry coverage 24/7 in ED;    Secured appointment with Dr Nunn in early March as new patient;

## 2023-02-16 NOTE — TELEPHONE ENCOUNTER
Patient called back and is scheduled 3/2/23 with Dr Gareth Parker [facility] Coordinator

## 2023-02-16 NOTE — TELEPHONE ENCOUNTER
FS,      Patient's wife called.  Asking for lab results from yesterday.    Thank you,  Saba Chicas RN

## 2023-02-17 NOTE — TELEPHONE ENCOUNTER
Multiple abnormal lab results.  Elevations in his ESR and CRP are consistent with his history of chronic osteomyelitis.  Elevated lipid panel, hemoglobin A1c and albuminuria needs to be addressed.  Patient was scheduled for appointment on March 2, 2023.    CHRISTEL Servin

## 2023-02-17 NOTE — RESULT ENCOUNTER NOTE
Multiple abnormal lab results.  Elevations in his ESR and CRP are consistent with his history of chronic osteomyelitis.  Elevated lipid panel, hemoglobin A1c and albuminuria needs to be addressed.  Patient was scheduled for appointment on March 2, 2023.    MD Gareth

## 2023-02-21 NOTE — TELEPHONE ENCOUNTER
Patients spouse stated that they are not returning to [place] and do not need our assistance, SORAIDAI.

## 2023-02-28 LAB — LABCORP INTERFACED MISCELLANEOUS TEST RESULT: NORMAL

## 2023-05-03 ENCOUNTER — TELEPHONE (OUTPATIENT)
Dept: FAMILY MEDICINE | Facility: CLINIC | Age: 48
End: 2023-05-03

## 2023-05-03 NOTE — TELEPHONE ENCOUNTER
Forms/Letter Request    Type of form/letter: provider f/u form    Have you been seen for this request: Yes     Do we have the form/letter: Yes: form    Who is the form from?  (if other please explain)    Where did/will the form come from? Patient or family brought in       When is form/letter needed by: n/a    How would you like the form/letter returned: Fax : [phone]    Patient Notified form requests are processed in 3-5 business days:Yes    Okay to leave a detailed message?: Yes at Other phone number:  *[phone]

## 2023-05-04 NOTE — TELEPHONE ENCOUNTER
I reviewed his form. There is no need for me to send the form back. It says if the name of the provider is correct and the information is correct no need to send the form.    - will scan the form into his chart.     MD Gareth